# Patient Record
Sex: FEMALE | Race: WHITE | NOT HISPANIC OR LATINO | Employment: UNEMPLOYED | URBAN - METROPOLITAN AREA
[De-identification: names, ages, dates, MRNs, and addresses within clinical notes are randomized per-mention and may not be internally consistent; named-entity substitution may affect disease eponyms.]

---

## 2023-12-14 ENCOUNTER — TELEPHONE (OUTPATIENT)
Dept: PRIMARY CARE | Facility: CLINIC | Age: 38
End: 2023-12-14
Payer: MEDICAID

## 2023-12-14 PROBLEM — R00.2 PALPITATIONS WITH REGULAR CARDIAC RHYTHM: Status: ACTIVE | Noted: 2023-12-14

## 2023-12-14 PROBLEM — K21.9 GERD (GASTROESOPHAGEAL REFLUX DISEASE): Status: ACTIVE | Noted: 2023-12-14

## 2023-12-14 PROBLEM — F41.9 ANXIETY: Status: ACTIVE | Noted: 2023-12-14

## 2023-12-14 PROBLEM — E55.9 VITAMIN D DEFICIENCY: Status: ACTIVE | Noted: 2023-12-14

## 2023-12-14 PROBLEM — E66.9 OBESITY (BMI 30-39.9): Status: ACTIVE | Noted: 2023-12-14

## 2023-12-14 PROBLEM — H61.23 HEARING LOSS OF BOTH EARS DUE TO CERUMEN IMPACTION: Status: ACTIVE | Noted: 2023-12-14

## 2023-12-14 PROBLEM — R23.4 PARAKERATOSIS: Status: ACTIVE | Noted: 2023-12-14

## 2023-12-14 PROBLEM — B36.0 TINEA VERSICOLOR: Status: ACTIVE | Noted: 2023-12-14

## 2023-12-14 PROBLEM — R53.83 FATIGUE: Status: ACTIVE | Noted: 2023-12-14

## 2023-12-14 NOTE — TELEPHONE ENCOUNTER
Pt is calling asking if you can put in her lab orders for her CPE on 1-18-24? Pt would like to get this done before her appt to have the results to discuss at her appointment. Pt is asking if someone can give her a call to let her know when the orders are in?

## 2024-01-02 RX ORDER — KETOCONAZOLE 20 MG/ML
SHAMPOO, SUSPENSION TOPICAL
COMMUNITY
End: 2024-01-18 | Stop reason: SDUPTHER

## 2024-01-02 RX ORDER — METOPROLOL SUCCINATE 25 MG/1
TABLET, EXTENDED RELEASE ORAL
COMMUNITY
End: 2024-01-18 | Stop reason: SDUPTHER

## 2024-01-02 RX ORDER — PANTOPRAZOLE SODIUM 40 MG/1
1 TABLET, DELAYED RELEASE ORAL DAILY
COMMUNITY
Start: 2021-07-20 | End: 2024-01-18

## 2024-01-02 RX ORDER — ERGOCALCIFEROL 1.25 MG/1
1 CAPSULE ORAL
COMMUNITY
Start: 2023-09-30 | End: 2024-01-18 | Stop reason: SDUPTHER

## 2024-01-02 RX ORDER — OMEPRAZOLE 40 MG/1
40 CAPSULE, DELAYED RELEASE ORAL DAILY
COMMUNITY
Start: 2023-09-13 | End: 2024-01-18 | Stop reason: SDUPTHER

## 2024-01-18 ENCOUNTER — APPOINTMENT (OUTPATIENT)
Dept: PRIMARY CARE | Facility: CLINIC | Age: 39
End: 2024-01-18
Payer: MEDICAID

## 2024-01-18 ENCOUNTER — OFFICE VISIT (OUTPATIENT)
Dept: PRIMARY CARE | Facility: CLINIC | Age: 39
End: 2024-01-18
Payer: MEDICAID

## 2024-01-18 VITALS
HEART RATE: 81 BPM | OXYGEN SATURATION: 97 % | HEIGHT: 63 IN | DIASTOLIC BLOOD PRESSURE: 82 MMHG | WEIGHT: 220 LBS | SYSTOLIC BLOOD PRESSURE: 128 MMHG | BODY MASS INDEX: 38.98 KG/M2

## 2024-01-18 DIAGNOSIS — Z13.31 NEGATIVE DEPRESSION SCREENING: ICD-10-CM

## 2024-01-18 DIAGNOSIS — B36.0 TINEA VERSICOLOR: ICD-10-CM

## 2024-01-18 DIAGNOSIS — K21.9 GASTROESOPHAGEAL REFLUX DISEASE WITHOUT ESOPHAGITIS: Primary | ICD-10-CM

## 2024-01-18 DIAGNOSIS — Z00.00 HEALTHCARE MAINTENANCE: ICD-10-CM

## 2024-01-18 DIAGNOSIS — E55.9 VITAMIN D DEFICIENCY: ICD-10-CM

## 2024-01-18 DIAGNOSIS — Z12.31 VISIT FOR SCREENING MAMMOGRAM: ICD-10-CM

## 2024-01-18 DIAGNOSIS — K86.0 ALCOHOL-INDUCED CHRONIC PANCREATITIS (MULTI): ICD-10-CM

## 2024-01-18 DIAGNOSIS — R00.2 PALPITATIONS WITH REGULAR CARDIAC RHYTHM: ICD-10-CM

## 2024-01-18 DIAGNOSIS — R53.83 OTHER FATIGUE: ICD-10-CM

## 2024-01-18 DIAGNOSIS — Z12.31 ENCOUNTER FOR SCREENING MAMMOGRAM FOR MALIGNANT NEOPLASM OF BREAST: ICD-10-CM

## 2024-01-18 DIAGNOSIS — F41.9 ANXIETY: ICD-10-CM

## 2024-01-18 DIAGNOSIS — E66.09 CLASS 2 OBESITY DUE TO EXCESS CALORIES WITHOUT SERIOUS COMORBIDITY WITH BODY MASS INDEX (BMI) OF 38.0 TO 38.9 IN ADULT: ICD-10-CM

## 2024-01-18 PROBLEM — E66.812 CLASS 2 OBESITY DUE TO EXCESS CALORIES WITHOUT SERIOUS COMORBIDITY WITH BODY MASS INDEX (BMI) OF 38.0 TO 38.9 IN ADULT: Status: ACTIVE | Noted: 2024-01-18

## 2024-01-18 PROCEDURE — 99214 OFFICE O/P EST MOD 30 MIN: CPT | Performed by: NURSE PRACTITIONER

## 2024-01-18 PROCEDURE — 3008F BODY MASS INDEX DOCD: CPT | Performed by: NURSE PRACTITIONER

## 2024-01-18 PROCEDURE — 1036F TOBACCO NON-USER: CPT | Performed by: NURSE PRACTITIONER

## 2024-01-18 PROCEDURE — 99396 PREV VISIT EST AGE 40-64: CPT | Performed by: NURSE PRACTITIONER

## 2024-01-18 PROCEDURE — 96127 BRIEF EMOTIONAL/BEHAV ASSMT: CPT | Performed by: NURSE PRACTITIONER

## 2024-01-18 RX ORDER — METOPROLOL SUCCINATE 25 MG/1
TABLET, EXTENDED RELEASE ORAL
Qty: 90 TABLET | Refills: 3 | Status: SHIPPED | OUTPATIENT
Start: 2024-01-18

## 2024-01-18 RX ORDER — OMEPRAZOLE 40 MG/1
40 CAPSULE, DELAYED RELEASE ORAL DAILY
Qty: 90 CAPSULE | Refills: 3 | Status: SHIPPED | OUTPATIENT
Start: 2024-01-18

## 2024-01-18 RX ORDER — ERGOCALCIFEROL 1.25 MG/1
1 CAPSULE ORAL
Qty: 13 CAPSULE | Refills: 3 | Status: SHIPPED | OUTPATIENT
Start: 2024-01-18

## 2024-01-18 RX ORDER — FAMOTIDINE 20 MG/1
20 TABLET, FILM COATED ORAL DAILY
COMMUNITY

## 2024-01-18 RX ORDER — KETOCONAZOLE 20 MG/ML
SHAMPOO, SUSPENSION TOPICAL
Qty: 120 ML | Refills: 5 | Status: SHIPPED | OUTPATIENT
Start: 2024-01-18

## 2024-01-18 ASSESSMENT — PATIENT HEALTH QUESTIONNAIRE - PHQ9
2. FEELING DOWN, DEPRESSED OR HOPELESS: NOT AT ALL
SUM OF ALL RESPONSES TO PHQ9 QUESTIONS 1 & 2: 0
1. LITTLE INTEREST OR PLEASURE IN DOING THINGS: NOT AT ALL

## 2024-01-18 NOTE — PROGRESS NOTES
Annual Comprehensive Medical Exam:    38 y.o. female presents for annual comprehensive medical evaluation and preventive services screening.      Recent hospitalizations, surgeries or ER visits: denies     Allowed to report any questions or concerns.    Diet: Healthy   Caffeine per day: 1 not every day  Water per day: 3 bottles   Exercise: not regimented, active at work  Alcohol: Quit alcohol   Tobacco:  Quit smoking   Dentist: every 6 months   Mammogram: 23  Pelvic and Pap: 2020, needs referral   CT Cardiac Scorin/20 Result 0. Was enrolled in study    Family history and social history reviewed.      Anxiety and palpations:   Med: Toprol XL  Denies suicide or homicide thoughts      GERD:  Med: Omeprazole and Pepcid  GI appt was cancelled  Never had an EGD     Tinea Versicolor: Brother has same condition. Has used OTC shampoo. Worse in the summer.      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   Social History     Socioeconomic History    Marital status:      Spouse name: Rony    Number of children: Not on file    Years of education: Not on file    Highest education level: Not on file   Occupational History    Occupation: [employer]   Tobacco Use    Smoking status: Former     Types: Cigarettes     Quit date:      Years since quitting: 10.0    Smokeless tobacco: Never   Substance and Sexual Activity    Alcohol use: Not  "Currently     Comment: Quit 2014    Drug use: Never    Sexual activity: Not on file   Other Topics Concern    Not on file   Social History Narrative    Not on file     Social Determinants of Health     Financial Resource Strain: Not on file   Food Insecurity: Not on file   Transportation Needs: Not on file   Physical Activity: Not on file   Stress: Not on file   Social Connections: Not on file   Intimate Partner Violence: Not on file   Housing Stability: Not on file           Current Outpatient Medications on File Prior to Visit   Medication Sig Dispense Refill    ergocalciferol (Vitamin D-2) 1.25 MG (73346 UT) capsule Take 1 capsule (1,250 mcg) by mouth 1 (one) time per week.      ketoconazole (NIZOral) 2 % shampoo APPLIED ON DAMPENED SKIN ONCE DAILY  LATHERED FOR 5 MINUTES BEFORE RINSING FOR 14 DAYS      metoprolol succinate XL (Toprol-XL) 25 mg 24 hr tablet TAKE ONE TABLET BY MOUTH DAILY for anxiety/palpitations      omeprazole (PriLOSEC) 40 mg DR capsule Take 1 capsule (40 mg) by mouth once daily.      [DISCONTINUED] pantoprazole (ProtoNix) 40 mg EC tablet Take 1 tablet (40 mg) by mouth once daily.       No current facility-administered medications on file prior to visit.       No Known Allergies    Visit Vitals  /82   Pulse 81   Ht 1.6 m (5' 3\")   Wt 99.8 kg (220 lb)   SpO2 97%   BMI 38.97 kg/m²   Smoking Status Former   BSA 2.11 m²         Physical Exam  Gen: Alert and oriented x3 female in no acute distress.  HEENT: Head is normocephalic.  Pupils equal and reactive to light.   Neck is supple without adenopathy or carotid bruits.  Heart: Regular rate and rhythm without murmurs.  Lungs: Clear to auscultation bilaterally.  Breast:            Deferred to Gyn  Pelvic:            Deferred to Gyn   Abdomen: Soft with normal bowel sounds.  No masses or pain to palpation.  No bruits auscultated.  Extremities: Good range of motion of all joints.  No significant edema. Pedal pulses +1-2/4  Neuro: No signs of focal " neurologic deficit.  No tremor.  Speech and hearing are normal.  DTRs +3/4;  Muscle Strength +5/5.  Musculoskeletal: Spine with good ROM.  Leg lengths are equal.  Skin: No significant or irregular nevi visualized.  Psych: normal affect.  No suicidal ideation.  Good judgment and insight.    Diagnosis/Plan:     1. Healthcare maintenance    - Comprehensive metabolic panel; Future  - CBC; Future  - Lipid panel; Future  - TSH with reflex to Free T4 if abnormal; Future  - Urinalysis with Reflex Microscopic; Future  - Referral to Obstetrics / Gynecology; Future    2. Gastroesophageal reflux disease without esophagitis    - Referral to Gastroenterology; Future  - omeprazole (PriLOSEC) 40 mg DR capsule; Take 1 capsule (40 mg) by mouth once daily.  Dispense: 90 capsule; Refill: 3    3. Alcohol-induced chronic pancreatitis (CMS/HCC)  No recent events. Quit alcohol 2014    4. Tinea versicolor    - ketoconazole (NIZOral) 2 % shampoo; APPLIED ON DAMPENED SKIN ONCE DAILY  LATHERED FOR 5 MINUTES BEFORE RINSING FOR 14 DAYS  Dispense: 120 mL; Refill: 5    5. Anxiety  Stable.   Continue current medication/treatment plan.     Aware at any time if thoughts of suicide or homicide are present contact medical services immediately.    - metoprolol succinate XL (Toprol-XL) 25 mg 24 hr tablet; TAKE ONE TABLET BY MOUTH DAILY for anxiety/palpitations  Dispense: 90 tablet; Refill: 3    6. Palpitations with regular cardiac rhythm  Stable  - metoprolol succinate XL (Toprol-XL) 25 mg 24 hr tablet; TAKE ONE TABLET BY MOUTH DAILY for anxiety/palpitations  Dispense: 90 tablet; Refill: 3      7. Vitamin D deficiency    - Vitamin D 25-Hydroxy,Total (for eval of Vitamin D levels); Future  - ergocalciferol (Vitamin D-2) 1.25 MG (39261 UT) capsule; Take 1 capsule (1,250 mcg) by mouth 1 (one) time per week.  Dispense: 13 capsule; Refill: 3    8. Encounter for screening mammogram for malignant neoplasm of breast    - BI mammo bilateral screening  tomosynthesis; Future    9. Class 2 obesity due to excess calories without serious comorbidity with body mass index (BMI) of 38.0 to 38.9 in adult  Patient encouraged to follow diet of lower carbohydrates and increase vegetable and fruit intake.   Patient also encouraged to increase water intake to 80 ounces/day.   Start or continue exercise for at least 30 minutes a day on most days of the week.     offers various ways to learn about healthy eating and healthy weight loss.     10. Negative depression screening:   Depression screening completed.         Medications refills will be completed as discussed.     Any labs or testing that is ordered will be reviewed and the results will be in your chart .   You can review these via  [employer].     Follow up 1 year for CPE  If she does not follow up with GI, she will need to be seen in 6 months    Prescriptions will not be filled unless you are compliant with your follow-up appointments or have a follow-up appointment scheduled as per the instruction of your provider. Refills for medications should be requested at the time of your office visit.     Please allow one week for refill requests to be completed.     Contact office with any questions or concerns.   Preferred communication is via  [employer]  Please contact [email or web address] if having issues with  [employer]    Larissa Gooden [facility]  [address], [address]  [place] [address]  Phone: [phone]    **Charting was completed using voice recognition technology and may include unintended errors**

## 2024-02-12 ENCOUNTER — APPOINTMENT (OUTPATIENT)
Dept: GASTROENTEROLOGY | Facility: CLINIC | Age: 39
End: 2024-02-12
Payer: MEDICAID

## 2024-02-21 ENCOUNTER — TELEPHONE (OUTPATIENT)
Dept: GASTROENTEROLOGY | Facility: CLINIC | Age: 39
End: 2024-02-21
Payer: MEDICAID

## 2024-03-25 ENCOUNTER — APPOINTMENT (OUTPATIENT)
Dept: GASTROENTEROLOGY | Facility: CLINIC | Age: 39
End: 2024-03-25
Payer: MEDICAID

## 2024-09-30 ENCOUNTER — HOSPITAL ENCOUNTER (OUTPATIENT)
Dept: RADIOLOGY | Facility: CLINIC | Age: 39
Discharge: HOME | End: 2024-09-30
Payer: MEDICAID

## 2024-09-30 VITALS — WEIGHT: 220.02 LBS | HEIGHT: 63 IN | BODY MASS INDEX: 38.98 KG/M2

## 2024-09-30 DIAGNOSIS — Z12.31 ENCOUNTER FOR SCREENING MAMMOGRAM FOR MALIGNANT NEOPLASM OF BREAST: ICD-10-CM

## 2024-09-30 DIAGNOSIS — Z12.31 VISIT FOR SCREENING MAMMOGRAM: ICD-10-CM

## 2024-09-30 PROCEDURE — 77067 SCR MAMMO BI INCL CAD: CPT | Performed by: RADIOLOGY

## 2024-09-30 PROCEDURE — 77063 BREAST TOMOSYNTHESIS BI: CPT | Performed by: RADIOLOGY

## 2024-09-30 PROCEDURE — 77067 SCR MAMMO BI INCL CAD: CPT

## 2024-10-14 ENCOUNTER — PATIENT MESSAGE (OUTPATIENT)
Dept: PRIMARY CARE | Facility: CLINIC | Age: 39
End: 2024-10-14
Payer: MEDICAID

## 2024-10-14 DIAGNOSIS — R00.2 PALPITATIONS WITH REGULAR CARDIAC RHYTHM: ICD-10-CM

## 2024-10-14 DIAGNOSIS — F41.9 ANXIETY: ICD-10-CM

## 2024-10-14 RX ORDER — METOPROLOL SUCCINATE 25 MG/1
TABLET, EXTENDED RELEASE ORAL
Qty: 90 TABLET | Refills: 0 | Status: SHIPPED | OUTPATIENT
Start: 2024-10-14

## 2025-01-20 ENCOUNTER — APPOINTMENT (OUTPATIENT)
Dept: PRIMARY CARE | Facility: CLINIC | Age: 40
End: 2025-01-20
Payer: MEDICAID

## 2025-01-22 ENCOUNTER — TELEPHONE (OUTPATIENT)
Dept: PRIMARY CARE | Facility: CLINIC | Age: 40
End: 2025-01-22
Payer: MEDICAID

## 2025-01-22 DIAGNOSIS — R00.2 PALPITATIONS WITH REGULAR CARDIAC RHYTHM: ICD-10-CM

## 2025-01-22 DIAGNOSIS — F41.9 ANXIETY: ICD-10-CM

## 2025-01-22 RX ORDER — METOPROLOL SUCCINATE 25 MG/1
TABLET, EXTENDED RELEASE ORAL
Qty: 90 TABLET | Refills: 1 | Status: SHIPPED | OUTPATIENT
Start: 2025-01-22

## 2025-01-22 NOTE — TELEPHONE ENCOUNTER
Pt was scheduled for Monday 1/20/25 but was rescheduled to 1/27/25 due to you being out of the office. Pt only has a couple days left of her Metoprolol she wanted to know if you can do a 30 day supply until she can see you on 1/27/25?     Medication:     Metoprolol Succinate XL 25 MG; Take 1 tablet daily for anxiety/palpitations.    Pharmacy: [facility]   Phone Number: [phone]

## 2025-01-27 ENCOUNTER — APPOINTMENT (OUTPATIENT)
Dept: PRIMARY CARE | Facility: CLINIC | Age: 40
End: 2025-01-27
Payer: MEDICAID

## 2025-01-27 VITALS
OXYGEN SATURATION: 100 % | SYSTOLIC BLOOD PRESSURE: 120 MMHG | HEIGHT: 63 IN | HEART RATE: 58 BPM | BODY MASS INDEX: 37.39 KG/M2 | WEIGHT: 211 LBS | DIASTOLIC BLOOD PRESSURE: 80 MMHG

## 2025-01-27 DIAGNOSIS — R00.2 PALPITATIONS WITH REGULAR CARDIAC RHYTHM: ICD-10-CM

## 2025-01-27 DIAGNOSIS — Z12.31 VISIT FOR SCREENING MAMMOGRAM: ICD-10-CM

## 2025-01-27 DIAGNOSIS — B36.0 TINEA VERSICOLOR: ICD-10-CM

## 2025-01-27 DIAGNOSIS — K21.9 GASTROESOPHAGEAL REFLUX DISEASE WITHOUT ESOPHAGITIS: ICD-10-CM

## 2025-01-27 DIAGNOSIS — Z01.419 WELL WOMAN EXAM WITH ROUTINE GYNECOLOGICAL EXAM: Primary | ICD-10-CM

## 2025-01-27 DIAGNOSIS — E66.812 CLASS 2 OBESITY DUE TO EXCESS CALORIES WITHOUT SERIOUS COMORBIDITY WITH BODY MASS INDEX (BMI) OF 37.0 TO 37.9 IN ADULT: ICD-10-CM

## 2025-01-27 DIAGNOSIS — Z00.00 HEALTH CARE MAINTENANCE: ICD-10-CM

## 2025-01-27 DIAGNOSIS — F41.9 ANXIETY: ICD-10-CM

## 2025-01-27 DIAGNOSIS — E55.9 VITAMIN D DEFICIENCY: ICD-10-CM

## 2025-01-27 DIAGNOSIS — E66.09 CLASS 2 OBESITY DUE TO EXCESS CALORIES WITHOUT SERIOUS COMORBIDITY WITH BODY MASS INDEX (BMI) OF 37.0 TO 37.9 IN ADULT: ICD-10-CM

## 2025-01-27 PROBLEM — K86.0 ALCOHOL-INDUCED CHRONIC PANCREATITIS (MULTI): Status: RESOLVED | Noted: 2024-01-18 | Resolved: 2025-01-27

## 2025-01-27 PROBLEM — E66.9 OBESITY (BMI 30-39.9): Status: RESOLVED | Noted: 2023-12-14 | Resolved: 2025-01-27

## 2025-01-27 PROCEDURE — 99214 OFFICE O/P EST MOD 30 MIN: CPT | Performed by: NURSE PRACTITIONER

## 2025-01-27 PROCEDURE — 3008F BODY MASS INDEX DOCD: CPT | Performed by: NURSE PRACTITIONER

## 2025-01-27 PROCEDURE — 99396 PREV VISIT EST AGE 40-64: CPT | Performed by: NURSE PRACTITIONER

## 2025-01-27 PROCEDURE — 1036F TOBACCO NON-USER: CPT | Performed by: NURSE PRACTITIONER

## 2025-01-27 RX ORDER — METOPROLOL SUCCINATE 25 MG/1
TABLET, EXTENDED RELEASE ORAL
Qty: 90 TABLET | Refills: 3 | Status: SHIPPED | OUTPATIENT
Start: 2025-01-27

## 2025-01-27 RX ORDER — LANSOPRAZOLE 30 MG/1
30 CAPSULE, DELAYED RELEASE ORAL
COMMUNITY

## 2025-01-27 RX ORDER — ERGOCALCIFEROL 1.25 MG/1
1 CAPSULE ORAL
Qty: 13 CAPSULE | Refills: 3 | Status: SHIPPED | OUTPATIENT
Start: 2025-01-27

## 2025-01-27 ASSESSMENT — PATIENT HEALTH QUESTIONNAIRE - PHQ9
SUM OF ALL RESPONSES TO PHQ9 QUESTIONS 1 & 2: 0
2. FEELING DOWN, DEPRESSED OR HOPELESS: NOT AT ALL
1. LITTLE INTEREST OR PLEASURE IN DOING THINGS: NOT AT ALL

## 2025-01-27 NOTE — PROGRESS NOTES
Annual Comprehensive Medical Exam:    39 y.o. female presents for annual comprehensive medical evaluation and preventive services screening.      Recent hospitalizations, surgeries or ER visits: denies     Allowed to report any questions or concerns.    Diet: Healthy   Caffeine per day: 1   Water per day: 4-5 bottles   Exercise: not regimented, active at work  Alcohol: Quit alcohol   Tobacco:  Quit smoking   Dentist: every 6 months   Mammogram: 24  Pelvic and Pap: , referral given   CT Cardiac Scorin/20 Result 0. Was enrolled in study    Family history and social history reviewed.      Anxiety and palpations:   Med: Toprol XL nightly   Working well   Denies suicide or homicide thoughts      GERD:  Med: Omeprazole not working so changed to Lansoprazole 30 mg bid OTC   Pepcid as needed  No GI visit yet     Tinea Versicolor: Brother has same condition. Has used OTC shampoo. Worse in the summer.      Born with 3 kidneys.   3rd kidney developed atrophy     Vitamin D deficiency:   Taking prescribed Vit D  Taking MVI    H/o alcohol induced pancreatitis: stopped ETOH     Past Medical History:   Diagnosis Date    Atrophy of kidney (terminal) 2020    Kidney atrophy    Morbid (severe) obesity due to excess calories (Multi) 2019    Obesity, Class III, BMI 40-49.9 (morbid obesity)    Other specified postprocedural states     History of Papanicolaou smear      Past Surgical History:   Procedure Laterality Date    OTHER SURGICAL HISTORY  10/21/2018    Nephrectomy     Family History   Problem Relation Name Age of Onset    Hypertension Mother      Coronary artery disease Father      Hyperlipidemia Father      Other (cabg) Father      Alcohol abuse Father      Ankylosing spondylitis Sister        Social History     Socioeconomic History    Marital status:      Spouse name: Rony    Number of children: Not on file    Years of education: Not on file    Highest education level: Not on file  "  Occupational History    Occupation: [employer]   Tobacco Use    Smoking status: Former     Current packs/day: 0.00     Types: Cigarettes     Quit date:      Years since quittin.0    Smokeless tobacco: Never   Substance and Sexual Activity    Alcohol use: Not Currently     Comment: Quit     Drug use: Never    Sexual activity: Not on file   Other Topics Concern    Not on file   Social History Narrative    Not on file     Social Drivers of Health     Financial Resource Strain: Not on file   Food Insecurity: Not on file   Transportation Needs: Not on file   Physical Activity: Not on file   Stress: Not on file   Social Connections: Not on file   Intimate Partner Violence: Not on file   Housing Stability: Not on file           Current Outpatient Medications on File Prior to Visit   Medication Sig Dispense Refill    ergocalciferol (Vitamin D-2) 1.25 MG (32315 UT) capsule Take 1 capsule (1,250 mcg) by mouth 1 (one) time per week. 13 capsule 3    famotidine (Pepcid) 20 mg tablet Take 1 tablet (20 mg) by mouth once daily.      ketoconazole (NIZOral) 2 % shampoo APPLIED ON DAMPENED SKIN ONCE DAILY  LATHERED FOR 5 MINUTES BEFORE RINSING FOR 14 DAYS 120 mL 5    metoprolol succinate XL (Toprol-XL) 25 mg 24 hr tablet TAKE ONE TABLET BY MOUTH DAILY for anxiety/palpitations 90 tablet 1    omeprazole (PriLOSEC) 40 mg DR capsule Take 1 capsule (40 mg) by mouth once daily. 90 capsule 3    [DISCONTINUED] metoprolol succinate XL (Toprol-XL) 25 mg 24 hr tablet TAKE ONE TABLET BY MOUTH DAILY for anxiety/palpitations 90 tablet 0     No current facility-administered medications on file prior to visit.       No Known Allergies    Visit Vitals  /80   Pulse 58   Ht 1.6 m (5' 3\")   Wt 95.7 kg (211 lb)   SpO2 100%   BMI 37.38 kg/m²   OB Status Having periods   Smoking Status Former   BSA 2.06 m²         Physical Exam    Gen: Alert and oriented x3 female in no acute distress.  HEENT: Head is normocephalic.  Neck is supple without " carotid bruits  Heart: Regular rate and rhythm without murmurs.  Lungs: Clear to auscultation bilaterally.  Breast:         Deferred to Gyn  Pelvic:           Deferred to Gyn   Abdomen: Soft with normal bowel sounds.  No masses or pain to palpation.    Extremities: Good range of motion of all joints.  No significant edema. Pedal pulses +1-2/4  Neuro: No signs of focal neurologic deficit.  No tremor.  Speech and hearing are normal.  DTRs +3/4;  Muscle Strength +5/5.  Musculoskeletal: Spine with good ROM.  Leg lengths are equal.  Skin: No significant or irregular nevi visualized.  Psych: normal affect.  No suicidal ideation.  Good judgment and insight.      Diagnosis/Plan:     1. Health care maintenance      2. Gastroesophageal reflux disease without esophagitis  Continue OTC   - Referral to Gastroenterology; Future    3. Anxiety  Stable.   Continue current medication/treatment plan.     Aware at any time if thoughts of suicide or homicide are present contact medical services immediately.    - metoprolol succinate XL (Toprol-XL) 25 mg 24 hr tablet; TAKE ONE TABLET BY MOUTH DAILY for anxiety/palpitations  Dispense: 90 tablet; Refill: 3    4. Palpitations with regular cardiac rhythm  Stable  - metoprolol succinate XL (Toprol-XL) 25 mg 24 hr tablet; TAKE ONE TABLET BY MOUTH DAILY for anxiety/palpitations  Dispense: 90 tablet; Refill: 3    5. Visit for screening mammogram    - BI mammo bilateral screening tomosynthesis; Future    6. Well woman exam with routine gynecological exam (Primary)    - Referral to Obstetrics / Gynecology; Future    7. Vitamin D deficiency  Vitamin D lab ordered. If your level is low,  a script for a weekly dose of Vitamin D will be sent to pharmacy. You should start or continue a Multivitamin.    - ergocalciferol (Vitamin D-2) 1.25 MG (68530 UT) capsule; Take 1 capsule (1,250 mcg) by mouth 1 (one) time per week.  Dispense: 13 capsule; Refill: 3    8. Tinea versicolor  She will contact us when  refill needed    9. Class 2 obesity due to excess calories without serious comorbidity with body mass index (BMI) of 37.0 to 37.9 in adult  Patient encouraged to follow diet of lower carbohydrates and increase vegetable and fruit intake.   Patient also encouraged to increase water intake to 80 ounces/day.   Start or continue exercise for at least 30 minutes a day on most days of the week.     offers various ways to learn about healthy eating and healthy weight loss.         Medications refills will be completed as discussed.     Any labs or testing that is ordered will be reviewed and the results will be in your chart .   You can review these via  [employer].     Follow up 1 year for CPE    Prescriptions will not be filled unless you are compliant with your follow-up appointments or have a follow-up appointment scheduled as per the instruction of your provider. Refills for medications should be requested at the time of your office visit.     Please allow one week for refill requests to be completed.     Contact office with any questions or concerns.   Preferred communication is via  [employer]  Please contact [email or web address] if having issues with  [employer]    Larissa STONE[facility]  [address], [address]  [place] [address]  Phone: [phone]    **Charting was completed using voice recognition technology and may include unintended errors**

## 2025-02-25 ENCOUNTER — HOSPITAL ENCOUNTER (EMERGENCY)
Facility: HOSPITAL | Age: 40
Discharge: HOME | End: 2025-02-25
Payer: MEDICAID

## 2025-02-25 VITALS
SYSTOLIC BLOOD PRESSURE: 180 MMHG | HEIGHT: 64 IN | TEMPERATURE: 97.9 F | RESPIRATION RATE: 18 BRPM | DIASTOLIC BLOOD PRESSURE: 99 MMHG | BODY MASS INDEX: 35.51 KG/M2 | WEIGHT: 208 LBS | HEART RATE: 79 BPM | OXYGEN SATURATION: 99 %

## 2025-02-25 DIAGNOSIS — N30.90 CYSTITIS: Primary | ICD-10-CM

## 2025-02-25 LAB
APPEARANCE UR: ABNORMAL
BILIRUB UR STRIP.AUTO-MCNC: NEGATIVE MG/DL
CLUE CELLS SPEC QL WET PREP: NORMAL
COLOR UR: ABNORMAL
GLUCOSE UR STRIP.AUTO-MCNC: NORMAL MG/DL
HCG UR QL IA.RAPID: NEGATIVE
KETONES UR STRIP.AUTO-MCNC: NEGATIVE MG/DL
LEUKOCYTE ESTERASE UR QL STRIP.AUTO: ABNORMAL
NITRITE UR QL STRIP.AUTO: NEGATIVE
PH UR STRIP.AUTO: 7 [PH]
PROT UR STRIP.AUTO-MCNC: NEGATIVE MG/DL
RBC # UR STRIP.AUTO: ABNORMAL MG/DL
RBC #/AREA URNS AUTO: ABNORMAL /HPF
SP GR UR STRIP.AUTO: 1.01
SQUAMOUS #/AREA URNS AUTO: ABNORMAL /HPF
T VAGINALIS SPEC QL WET PREP: NORMAL
TRICHOMONAS REFLEX COMMENT: NORMAL
UROBILINOGEN UR STRIP.AUTO-MCNC: NORMAL MG/DL
WBC #/AREA URNS AUTO: >50 /HPF
WBC VAG QL WET PREP: NORMAL
YEAST VAG QL WET PREP: NORMAL

## 2025-02-25 PROCEDURE — 81025 URINE PREGNANCY TEST: CPT

## 2025-02-25 PROCEDURE — 99284 EMERGENCY DEPT VISIT MOD MDM: CPT

## 2025-02-25 PROCEDURE — 87086 URINE CULTURE/COLONY COUNT: CPT | Mod: STJLAB | Performed by: EMERGENCY MEDICINE

## 2025-02-25 PROCEDURE — 99283 EMERGENCY DEPT VISIT LOW MDM: CPT

## 2025-02-25 PROCEDURE — 2500000001 HC RX 250 WO HCPCS SELF ADMINISTERED DRUGS (ALT 637 FOR MEDICARE OP)

## 2025-02-25 PROCEDURE — 81001 URINALYSIS AUTO W/SCOPE: CPT | Performed by: EMERGENCY MEDICINE

## 2025-02-25 PROCEDURE — 87210 SMEAR WET MOUNT SALINE/INK: CPT

## 2025-02-25 RX ORDER — CEPHALEXIN 500 MG/1
500 CAPSULE ORAL 2 TIMES DAILY
Qty: 13 CAPSULE | Refills: 0 | Status: SHIPPED | OUTPATIENT
Start: 2025-02-25 | End: 2025-03-04

## 2025-02-25 RX ORDER — CEPHALEXIN 500 MG/1
500 CAPSULE ORAL ONCE
Status: COMPLETED | OUTPATIENT
Start: 2025-02-25 | End: 2025-02-25

## 2025-02-25 RX ADMIN — CEPHALEXIN 500 MG: 500 CAPSULE ORAL at 16:01

## 2025-02-25 ASSESSMENT — LIFESTYLE VARIABLES
EVER HAD A DRINK FIRST THING IN THE MORNING TO STEADY YOUR NERVES TO GET RID OF A HANGOVER: NO
EVER FELT BAD OR GUILTY ABOUT YOUR DRINKING: NO
HAVE YOU EVER FELT YOU SHOULD CUT DOWN ON YOUR DRINKING: NO
TOTAL SCORE: 0
HAVE PEOPLE ANNOYED YOU BY CRITICIZING YOUR DRINKING: NO

## 2025-02-25 ASSESSMENT — PAIN - FUNCTIONAL ASSESSMENT: PAIN_FUNCTIONAL_ASSESSMENT: 0-10

## 2025-02-25 ASSESSMENT — COLUMBIA-SUICIDE SEVERITY RATING SCALE - C-SSRS
1. IN THE PAST MONTH, HAVE YOU WISHED YOU WERE DEAD OR WISHED YOU COULD GO TO SLEEP AND NOT WAKE UP?: NO
2. HAVE YOU ACTUALLY HAD ANY THOUGHTS OF KILLING YOURSELF?: NO
6. HAVE YOU EVER DONE ANYTHING, STARTED TO DO ANYTHING, OR PREPARED TO DO ANYTHING TO END YOUR LIFE?: NO

## 2025-02-25 ASSESSMENT — PAIN SCALES - GENERAL: PAINLEVEL_OUTOF10: 0 - NO PAIN

## 2025-02-25 NOTE — Clinical Note
Ruby Choudhury was seen and treated in our emergency department on 2/25/2025.  She may return to work on 02/26/2025.       If you have any questions or concerns, please don't hesitate to call.      Thais Foster PA-C

## 2025-02-25 NOTE — DISCHARGE INSTRUCTIONS
Urine consistent with a UTI. Swabs negative for yeast and bacterial vaginosis. Sending you home with keflex. Take twice a day for 7 days. Sending off urine culture to confirm that keflex will work for the UTI. If you still have symptoms, please follow up with PCP as it is concerning that you took macrobid and bactrim and did not get rid of the UTI. Both of these medications are very common antibiotics for UTIs.     If you develop fevers, back pain, abdominal pain, worsening symptoms of any kind, please come back to the ER>

## 2025-02-25 NOTE — ED PROVIDER NOTES
"HPI   Chief Complaint   Patient presents with    UTI       Patient is a 39-year-old female with PMH of GERD, chronic pancreatitis, atrophy of kidney recurrent UTIs, presenting today for concerns of UTI.  Believes that she had a UTI 1 month ago.  Went online and someone prescribed her Macrobid for the UTI.  States that after she finished a 7-day course of Macrobid, she felt as if she \"still had a UTI\" so she took a 5-day course of Bactrim that was given to her by a friend.  States that she still has symptoms.  Denies abdominal pain, flank pain, dysuria, hematuria, increased urinary frequency.  States that she is currently on her cycle.  Reports that \"something is just off down there and it feels like her urethra hurts\".  No fever, chills, nausea or vomiting.  Denies any vaginal discharge or concerns of UTI.              Patient History   Past Medical History:   Diagnosis Date    Alcohol-induced chronic pancreatitis (Multi) 2024    Atrophy of kidney (terminal) 2020    Kidney atrophy    Morbid (severe) obesity due to excess calories (Multi) 2019    Obesity, Class III, BMI 40-49.9 (morbid obesity)    Other specified postprocedural states     History of Papanicolaou smear     Past Surgical History:   Procedure Laterality Date    OTHER SURGICAL HISTORY  10/21/2018    Nephrectomy     Family History   Problem Relation Name Age of Onset    Hypertension Mother      Coronary artery disease Father      Hyperlipidemia Father      Other (cabg) Father      Alcohol abuse Father      Ankylosing spondylitis Sister       Social History     Tobacco Use    Smoking status: Former     Current packs/day: 0.00     Types: Cigarettes     Quit date:      Years since quittin.1    Smokeless tobacco: Never   Substance Use Topics    Alcohol use: Not Currently     Comment: Quit     Drug use: Never       Physical Exam   ED Triage Vitals [25 1245]   Temperature Heart Rate Respirations BP   36.6 °C (97.9 °F) 79 18 " (!) 180/99      Pulse Ox Temp src Heart Rate Source Patient Position   99 % -- Monitor Lying      BP Location FiO2 (%)     Right arm --       Physical Exam  Vitals and nursing note reviewed.   Constitutional:       General: She is not in acute distress.     Appearance: Normal appearance. She is not ill-appearing.   HENT:      Head: Normocephalic and atraumatic.      Right Ear: External ear normal.      Left Ear: External ear normal.      Nose: Nose normal. No congestion or rhinorrhea.      Mouth/Throat:      Mouth: Mucous membranes are moist.      Pharynx: Oropharynx is clear. No oropharyngeal exudate or posterior oropharyngeal erythema.   Eyes:      Extraocular Movements: Extraocular movements intact.      Conjunctiva/sclera: Conjunctivae normal.      Pupils: Pupils are equal, round, and reactive to light.   Cardiovascular:      Rate and Rhythm: Normal rate and regular rhythm.      Heart sounds: Normal heart sounds.   Pulmonary:      Effort: No accessory muscle usage or respiratory distress.      Breath sounds: Normal breath sounds. No wheezing, rhonchi or rales.   Abdominal:      General: Abdomen is flat. Bowel sounds are normal. There is no distension.      Palpations: Abdomen is soft.      Tenderness: There is no abdominal tenderness. There is no right CVA tenderness or left CVA tenderness.   Musculoskeletal:         General: No swelling or deformity. Normal range of motion.      Cervical back: Normal range of motion and neck supple.      Right lower leg: No edema.      Left lower leg: No edema.   Skin:     General: Skin is warm and dry.      Capillary Refill: Capillary refill takes less than 2 seconds.   Neurological:      General: No focal deficit present.      Mental Status: She is alert and oriented to person, place, and time.      GCS: GCS eye subscore is 4. GCS verbal subscore is 5. GCS motor subscore is 6.      Cranial Nerves: Cranial nerves 2-12 are intact.      Sensory: No sensory deficit.      Motor:  "Motor function is intact. No weakness.   Psychiatric:         Mood and Affect: Mood and affect normal.         Speech: Speech normal.         Behavior: Behavior normal. Behavior is cooperative.           ED Course & MDM   ED Course as of 02/25/25 1557   Tue Feb 25, 2025   1346 Wet Prep with Trichomonas Reflex If Neg  No BV, yeast, clue cells [ML]   1512 HCG, Urine: NEGATIVE [ML]   1547 Leukocyte Esterase, Urine(!): 500 [place]/uL [ML]      ED Course User Index  [ML] Thais Foster PA-C         Diagnoses as of 02/25/25 1557   Cystitis                 No data recorded     Wiley Coma Scale Score: 15 (02/25/25 1246 : Eula Pillai, MAJO)                           Medical Decision Making  39-year-old female presenting today for concerns of UTI.  States that she has \"irritation at her urethra\".  Denies vaginal discharge or concern of STI.  No flank pain noted on my exam, no abdominal pain noted on my exam.  Vitals are stable.  She is afebrile.  Considering she has been through Bactrim and Macrobid at home in the past month, we will also obtain a wet prep to evaluate if this is BV or yeast infection causing the irritation.  Deferred  exam as she has no abdominal pain and I have low suspicion for PID.  UA obtained.  Urine pregnancy test negative.    Wet prep negative for trichomonas, clue cells yeast cells.  UA showing findings of gross leukocyte Estrace and 50+ WBC in urine.  Consistent with UTI.  Will trial Keflex considering in the past months she has done full courses of Bactrim and Macrobid.  Will send urine off for culture to confirm.  Discussed that since she has been through 2 antibiotics now, would recommend following up with primary care for further evaluation if symptoms are not fully relieved after Keflex.  Discussed strict return precautions.        Procedure  Procedures     Thais Foster PA-C  02/25/25 1557    "

## 2025-02-26 LAB
BACTERIA UR CULT: NORMAL
HOLD SPECIMEN: NORMAL

## 2025-03-03 ENCOUNTER — PATIENT MESSAGE (OUTPATIENT)
Dept: PRIMARY CARE | Facility: CLINIC | Age: 40
End: 2025-03-03
Payer: MEDICAID

## 2025-03-03 DIAGNOSIS — N30.00 ACUTE CYSTITIS WITHOUT HEMATURIA: Primary | ICD-10-CM

## 2025-03-05 DIAGNOSIS — N39.0 CHRONIC UTI: Primary | ICD-10-CM

## 2025-03-05 LAB — BACTERIA UR CULT: NORMAL

## 2025-03-16 ENCOUNTER — HOSPITAL ENCOUNTER (EMERGENCY)
Facility: HOSPITAL | Age: 40
Discharge: HOME | End: 2025-03-16
Attending: STUDENT IN AN ORGANIZED HEALTH CARE EDUCATION/TRAINING PROGRAM
Payer: MEDICAID

## 2025-03-16 VITALS
HEART RATE: 74 BPM | SYSTOLIC BLOOD PRESSURE: 173 MMHG | RESPIRATION RATE: 15 BRPM | HEIGHT: 64 IN | OXYGEN SATURATION: 100 % | WEIGHT: 208 LBS | BODY MASS INDEX: 35.51 KG/M2 | TEMPERATURE: 97.3 F | DIASTOLIC BLOOD PRESSURE: 81 MMHG

## 2025-03-16 DIAGNOSIS — R30.0 DYSURIA: Primary | ICD-10-CM

## 2025-03-16 LAB
APPEARANCE UR: ABNORMAL
BACTERIA #/AREA URNS AUTO: ABNORMAL /HPF
BILIRUB UR STRIP.AUTO-MCNC: NEGATIVE MG/DL
COLOR UR: YELLOW
GLUCOSE UR STRIP.AUTO-MCNC: NORMAL MG/DL
KETONES UR STRIP.AUTO-MCNC: ABNORMAL MG/DL
LEUKOCYTE ESTERASE UR QL STRIP.AUTO: ABNORMAL
NITRITE UR QL STRIP.AUTO: NEGATIVE
PH UR STRIP.AUTO: 5.5 [PH]
PROT UR STRIP.AUTO-MCNC: NEGATIVE MG/DL
RBC # UR STRIP.AUTO: ABNORMAL MG/DL
RBC #/AREA URNS AUTO: ABNORMAL /HPF
SP GR UR STRIP.AUTO: 1
UROBILINOGEN UR STRIP.AUTO-MCNC: NORMAL MG/DL
WBC #/AREA URNS AUTO: >50 /HPF
YEAST BUDDING #/AREA UR COMP ASSIST: PRESENT /HPF

## 2025-03-16 PROCEDURE — 81003 URINALYSIS AUTO W/O SCOPE: CPT | Performed by: EMERGENCY MEDICINE

## 2025-03-16 PROCEDURE — 2500000001 HC RX 250 WO HCPCS SELF ADMINISTERED DRUGS (ALT 637 FOR MEDICARE OP)

## 2025-03-16 PROCEDURE — 81001 URINALYSIS AUTO W/SCOPE: CPT | Performed by: STUDENT IN AN ORGANIZED HEALTH CARE EDUCATION/TRAINING PROGRAM

## 2025-03-16 PROCEDURE — 99283 EMERGENCY DEPT VISIT LOW MDM: CPT | Performed by: STUDENT IN AN ORGANIZED HEALTH CARE EDUCATION/TRAINING PROGRAM

## 2025-03-16 PROCEDURE — 2500000004 HC RX 250 GENERAL PHARMACY W/ HCPCS (ALT 636 FOR OP/ED): Performed by: STUDENT IN AN ORGANIZED HEALTH CARE EDUCATION/TRAINING PROGRAM

## 2025-03-16 PROCEDURE — 99284 EMERGENCY DEPT VISIT MOD MDM: CPT | Performed by: STUDENT IN AN ORGANIZED HEALTH CARE EDUCATION/TRAINING PROGRAM

## 2025-03-16 PROCEDURE — 96372 THER/PROPH/DIAG INJ SC/IM: CPT | Performed by: STUDENT IN AN ORGANIZED HEALTH CARE EDUCATION/TRAINING PROGRAM

## 2025-03-16 RX ORDER — PHENAZOPYRIDINE HYDROCHLORIDE 100 MG/1
95 TABLET, FILM COATED ORAL ONCE
Status: COMPLETED | OUTPATIENT
Start: 2025-03-16 | End: 2025-03-16

## 2025-03-16 RX ORDER — PHENAZOPYRIDINE HYDROCHLORIDE 200 MG/1
200 TABLET, FILM COATED ORAL 3 TIMES DAILY
Qty: 6 TABLET | Refills: 0 | Status: SHIPPED | OUTPATIENT
Start: 2025-03-16 | End: 2025-03-18

## 2025-03-16 RX ADMIN — LIDOCAINE HYDROCHLORIDE 1 G: 10 INJECTION, SOLUTION EPIDURAL; INFILTRATION; INTRACAUDAL; PERINEURAL at 13:23

## 2025-03-16 RX ADMIN — PHENAZOPYRIDINE 100 MG: 100 TABLET ORAL at 13:23

## 2025-03-16 ASSESSMENT — COLUMBIA-SUICIDE SEVERITY RATING SCALE - C-SSRS
6. HAVE YOU EVER DONE ANYTHING, STARTED TO DO ANYTHING, OR PREPARED TO DO ANYTHING TO END YOUR LIFE?: NO
2. HAVE YOU ACTUALLY HAD ANY THOUGHTS OF KILLING YOURSELF?: NO
1. IN THE PAST MONTH, HAVE YOU WISHED YOU WERE DEAD OR WISHED YOU COULD GO TO SLEEP AND NOT WAKE UP?: NO

## 2025-03-16 ASSESSMENT — LIFESTYLE VARIABLES
HAVE YOU EVER FELT YOU SHOULD CUT DOWN ON YOUR DRINKING: NO
EVER FELT BAD OR GUILTY ABOUT YOUR DRINKING: NO
TOTAL SCORE: 0
HAVE PEOPLE ANNOYED YOU BY CRITICIZING YOUR DRINKING: NO
EVER HAD A DRINK FIRST THING IN THE MORNING TO STEADY YOUR NERVES TO GET RID OF A HANGOVER: NO

## 2025-03-16 ASSESSMENT — PAIN DESCRIPTION - LOCATION: LOCATION: ABDOMEN

## 2025-03-16 ASSESSMENT — PAIN DESCRIPTION - PAIN TYPE: TYPE: ACUTE PAIN

## 2025-03-16 ASSESSMENT — PAIN DESCRIPTION - ORIENTATION: ORIENTATION: LOWER

## 2025-03-16 ASSESSMENT — PAIN SCALES - GENERAL: PAINLEVEL_OUTOF10: 2

## 2025-03-16 ASSESSMENT — PAIN - FUNCTIONAL ASSESSMENT: PAIN_FUNCTIONAL_ASSESSMENT: 0-10

## 2025-03-16 NOTE — DISCHARGE INSTRUCTIONS
You were evaluated for a UTI.  You were treated with a strong dose of antibiotics as well as a medication to help with the pain associated with the UTI.  A prescription for this medication was sent to your pharmacy.  A urinalysis was collected, and we will contact you with the results of the culture once they have been evaluated.  We would recommend that you continue to take the Macrobid as prescribed.    If you have fevers or chills, or worsening signs of UTI, please return to the emergency room.

## 2025-03-16 NOTE — ED PROVIDER NOTES
History of Present Illness     History provided by: Patient  Limitations to History: None  External Records Reviewed with Brief Summary:  see below    HPI:  Ruby Choudhury is a 39 y.o. female with dysuria.  No significant past medical history.  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  Patient reports that she has had dysuria for the past few weeks.  She did see her PCP, who obtained a UA and prescribed a course of Keflex.  She reports that she took that as directed, but noticed continued symptoms afterwards.  Thus she was given a prescription for Bactrim, and took 2 days of that but symptoms were persistent, and thus was switched to Macrobid.  Per chart review she does have a UA in the chart from 2/25 which was positive for 500 leukocyte esterase but ultimately did not show any clinically significant bacterial growth, in fact contaminant was seen on culture.  Repeat culture was done on 3/3 which was negative for any clinically significant growth at that time.  Patient reports that her initial symptoms were dysuria primarily, no change in urination frequency.  She denied any suprapubic pain.  At this time she denies any suprapubic pain.  No fevers or chills.  Dysuria is improving.  She presents to the emergency room today because she had pain when she removed her period cup.  No other symptoms, no headache, no chest pain, shortness of breath, abdominal pain, nausea, vomiting, change in bowels, lower extremity edema.    Physical Exam   Triage vitals:  T 36.3 °C (97.3 °F)  HR (!) 110  /83  RR 16  O2 100 % None (Room air)    Physical Exam  Constitutional:       General: She is not in acute distress.     Appearance: Normal appearance. She is not ill-appearing.   HENT:      Head: Normocephalic and atraumatic.      Mouth/Throat:      Mouth: Mucous membranes are moist.   Eyes:      Extraocular Movements: Extraocular movements intact.      Pupils: Pupils are  equal, round, and reactive to light.   Cardiovascular:      Rate and Rhythm: Normal rate and regular rhythm.      Pulses: Normal pulses.      Heart sounds: No murmur heard.     No gallop.   Pulmonary:      Effort: Pulmonary effort is normal. No respiratory distress.      Breath sounds: Normal breath sounds. No wheezing, rhonchi or rales.   Abdominal:      General: Abdomen is flat. There is no distension.      Palpations: Abdomen is soft.      Tenderness: There is no abdominal tenderness. There is no right CVA tenderness, left CVA tenderness, guarding or rebound.   Musculoskeletal:         General: Normal range of motion.      Cervical back: Normal range of motion and neck supple.      Right lower leg: No edema.      Left lower leg: No edema.   Skin:     General: Skin is warm and dry.   Neurological:      General: No focal deficit present.      Mental Status: She is alert and oriented to person, place, and time.      Sensory: No sensory deficit.      Motor: No weakness.          Medical Decision Making & ED Course   Medical Decision Makin y.o. female with ongoing symptoms of dysuria.  No significant past medical history.  Did have a nephrectomy about 20 years ago for supernumerary kidney.  Has completed at least 1 course of antibiotics, and is 2 days into an additional course of Macrobid.  Reports that her dysuria is improving, but did have some pain when removing her period cup today.  She denies any suprapubic pain at this time.  No systemic symptoms.  On physical exam, no significant abnormalities.  Heart rate was regular, lungs clear to auscultation, nontender to palpation the abdomen.  No suprapubic tenderness noted.  No lower extremity edema.    Given that her symptoms are improving, anticipate that she does have a UTI that will improve with continuation of her antibiotic treatment course.  We will obtain a UA today.  We did discuss pelvic exam or STD testing, but patient declined.  She reports that she  has not been sexually active within the past month, and has been augments with her .  She does not have any symptoms of an STD per patient report.  UA did show blood, ketones, and 500 leukocyte esterase.  WBCs and bacteria were seen as well, as well as budding yeast.  We will give her a dose of Rocephin in the emergency room, and a dose of pyridium.  She did have some relief after the dose of Pyridium.    Patient was discharged home in stable condition.  She was instructed to finish her course of Macrobid.  We will call with results of the urine culture once they are made available.  She should follow-up with her PCP.  She will also be given a prescription for Pyridium for symptomatic relief.  She was instructed to return if she develops fevers or chills, or if she has urinary retention or significant worsening of her dysuria.  ----  Social Determinants of Health which Significantly Impact Care: None identified     EKG Independent Interpretation: EKG not obtained    Independent Result Review and Interpretation: Relevant laboratory and radiographic results were reviewed and independently interpreted by myself.  As necessary, they are commented on in the ED Course.    Chronic conditions affecting the patient's care: As documented above in MDM    The patient was discussed with the following consultants/services: None    Care Considerations: Discussed STD testing, but patient declined.    ED Course:  ED Course as of 03/16/25 1457   Sun Mar 16, 2025   1346 Did discuss STD testing at this time, but patient declined.  She reports that she has not had any sexual activity in the last month, and is monogamous with her . [CH]   7803 Reviewed chart, UA collected 2/25.  Significant for turbid urine with 2+ blood and 500 leukocyte esterase.  Culture indicated contamination.  Another culture was obtained on 3/3, which did not show any clinically significant growth. [CH]      ED Course User Index  [CH] Gayathri  DO Mikhail         Diagnoses as of 03/16/25 145   Dysuria     Disposition   As a result of the work-up, the patient was discharged home.  she was informed of her diagnosis and instructed to come back with any concerns or worsening of condition.  she and was agreeable to the plan as discussed above.  she was given the opportunity to ask questions.  All of the patient's questions were answered.    Procedures   Procedures    Patient seen and discussed with ED attending physician.    Gayathri Elizondo DO  Internal Medicine PGY-1 Resident  Emergency Medicine       Gayathri Elizondo DO  Resident  03/16/25 7911

## 2025-03-20 ENCOUNTER — APPOINTMENT (OUTPATIENT)
Dept: OBSTETRICS AND GYNECOLOGY | Facility: CLINIC | Age: 40
End: 2025-03-20
Payer: MEDICAID

## 2025-03-20 VITALS
BODY MASS INDEX: 34.83 KG/M2 | SYSTOLIC BLOOD PRESSURE: 146 MMHG | HEIGHT: 64 IN | WEIGHT: 204 LBS | DIASTOLIC BLOOD PRESSURE: 88 MMHG

## 2025-03-20 DIAGNOSIS — Z01.419 WELL WOMAN EXAM: Primary | ICD-10-CM

## 2025-03-20 DIAGNOSIS — Z01.419 WELL WOMAN EXAM WITH ROUTINE GYNECOLOGICAL EXAM: ICD-10-CM

## 2025-03-20 DIAGNOSIS — R39.9 UTI SYMPTOMS: ICD-10-CM

## 2025-03-20 LAB
POC APPEARANCE, URINE: CLEAR
POC BILIRUBIN, URINE: NEGATIVE
POC BLOOD, URINE: ABNORMAL
POC COLOR, URINE: YELLOW
POC GLUCOSE, URINE: NEGATIVE MG/DL
POC KETONES, URINE: NEGATIVE MG/DL
POC LEUKOCYTES, URINE: ABNORMAL
POC NITRITE,URINE: NEGATIVE
POC PH, URINE: 7.5 PH
POC PROTEIN, URINE: NEGATIVE MG/DL
POC SPECIFIC GRAVITY, URINE: 1.02
POC UROBILINOGEN, URINE: 0.2 EU/DL

## 2025-03-20 PROCEDURE — 3008F BODY MASS INDEX DOCD: CPT | Performed by: ADVANCED PRACTICE MIDWIFE

## 2025-03-20 PROCEDURE — 87624 HPV HI-RISK TYP POOLED RSLT: CPT

## 2025-03-20 PROCEDURE — 99386 PREV VISIT NEW AGE 40-64: CPT | Performed by: ADVANCED PRACTICE MIDWIFE

## 2025-03-20 PROCEDURE — 1036F TOBACCO NON-USER: CPT | Performed by: ADVANCED PRACTICE MIDWIFE

## 2025-03-20 PROCEDURE — 81003 URINALYSIS AUTO W/O SCOPE: CPT | Performed by: ADVANCED PRACTICE MIDWIFE

## 2025-03-20 ASSESSMENT — PAIN SCALES - GENERAL: PAINLEVEL_OUTOF10: 0-NO PAIN

## 2025-03-20 NOTE — PROGRESS NOTES
Assessment/Plan   Problem List Items Addressed This Visit             ICD-10-CM       Medium    Well woman exam - Primary Z01.419     Pap w/ cotesting, Declines STI screen  Mammogram due in September  UA with trace blood (on menstrual cycle) and trace leukocytes-culture held due to pending culture, also referred to Uro/Gyn to discuss history and symptoms    Recommended to check BP at home to monitor given elevated BP at office visits           Other Visit Diagnoses         Codes    Well woman exam with routine gynecological exam     Z01.419    Relevant Orders    THINPREP PAP TEST (>30)    UTI symptoms     R39.9    Relevant Orders    POCT UA Automated manually resulted (Completed)    Urine culture            Lin Ferreira, BERTHA-CNM     Subjective   Ruby Choudhury is a 39 y.o. female who is here for a routine exam.     New patient to establish care     +Health anxiety/white coat HTN  Metoprolol daily, Xanax prn     Concerns re: UTI-slightly better but reports still symptomatic with urgency    Symptoms started about a month ago   Has taken multiple antibiotics-Macrobid, Bactrim, Keflex, and Ceftriaxone  ED visits x 2->2/25, 3/16 (culture pending)  Reports nephrectomy   3/3 culture negative    Reports urethra irritation while removing menstrual cup also which was concerning     Lives with:    Current employment: Owner of [employer]   T/E/D: Former tobacco (quit 2014)/recovery ETOH/drug use 2014  Up to date vision/dental: no/yes   PCP: yes   Menstrual history: monthly cycles, x 7 days   Sexual history: monogamous male partner x 11 years  Denies DV   Pregnancy history:  G/P 0  T: P:  EAB:   Ectopic:   SAB:   L:      Diet: trying to eat    Exercise: walking     PMH, PSH, and Family hx reviewed and updated    Current contraception: vasectomy  Refill Y/N:    Last pap: 2020  History of abnormal Pap smear: no  Family history of breast, uterine,  ovarian cancer: no  Regular self breast exam: yes  Last  "mammogram: 09/2024  History of abnormal mammogram: no-dense breasts           Review of Systems    Objective   /88   Ht 1.626 m (5' 4\")   Wt 92.5 kg (204 lb)   LMP 03/14/2025   BMI 35.02 kg/m²     Physical Exam  Constitutional:       Appearance: Normal appearance.   HENT:      Head: Normocephalic.   Neck:      Thyroid: No thyroid mass, thyromegaly or thyroid tenderness.   Cardiovascular:      Rate and Rhythm: Normal rate and regular rhythm.   Pulmonary:      Effort: Pulmonary effort is normal.      Breath sounds: Normal breath sounds.   Chest:   Breasts:     Right: Normal. No mass, nipple discharge or tenderness.      Left: Normal. No mass, nipple discharge or tenderness.   Abdominal:      Palpations: Abdomen is soft.   Genitourinary:     General: Normal vulva.      Vagina: Normal.      Cervix: Normal.   Musculoskeletal:         General: Normal range of motion.   Lymphadenopathy:      Upper Body:      Right upper body: No axillary adenopathy.      Left upper body: No axillary adenopathy.   Skin:     General: Skin is warm and dry.   Neurological:      Mental Status: She is alert and oriented to person, place, and time.   Psychiatric:         Mood and Affect: Mood normal.         "

## 2025-03-21 NOTE — ASSESSMENT & PLAN NOTE
Pap w/ cotesting, Declines STI screen  Mammogram due in September  UA with trace blood (on menstrual cycle) and trace leukocytes-culture held due to pending culture, also referred to Uro/Gyn to discuss history and symptoms    Recommended to check BP at home to monitor given elevated BP at office visits

## 2025-03-28 LAB
CYTOLOGY CMNT CVX/VAG CYTO-IMP: NORMAL
HPV HR 12 DNA GENITAL QL NAA+PROBE: NEGATIVE
HPV HR GENOTYPES PNL CVX NAA+PROBE: NEGATIVE
HPV16 DNA SPEC QL NAA+PROBE: NEGATIVE
HPV18 DNA SPEC QL NAA+PROBE: NEGATIVE
LAB AP HPV GENOTYPE QUESTION: YES
LAB AP HPV HR: NORMAL
LABORATORY COMMENT REPORT: NORMAL
LABORATORY COMMENT REPORT: NORMAL
LMP START DATE: NORMAL
PATH REPORT.TOTAL CANCER: NORMAL

## 2025-03-31 ENCOUNTER — APPOINTMENT (OUTPATIENT)
Dept: OBSTETRICS AND GYNECOLOGY | Facility: CLINIC | Age: 40
End: 2025-03-31
Payer: MEDICAID

## 2025-03-31 VITALS
DIASTOLIC BLOOD PRESSURE: 70 MMHG | BODY MASS INDEX: 34.83 KG/M2 | HEIGHT: 64 IN | SYSTOLIC BLOOD PRESSURE: 110 MMHG | WEIGHT: 204 LBS

## 2025-03-31 DIAGNOSIS — R30.0 DYSURIA: Primary | ICD-10-CM

## 2025-03-31 DIAGNOSIS — R39.9 UTI SYMPTOMS: ICD-10-CM

## 2025-03-31 DIAGNOSIS — N39.46 MIXED STRESS AND URGE URINARY INCONTINENCE: ICD-10-CM

## 2025-03-31 LAB
POC APPEARANCE, URINE: CLEAR
POC BILIRUBIN, URINE: NEGATIVE
POC BLOOD, URINE: ABNORMAL
POC COLOR, URINE: YELLOW
POC GLUCOSE, URINE: NEGATIVE MG/DL
POC KETONES, URINE: NEGATIVE MG/DL
POC LEUKOCYTES, URINE: ABNORMAL
POC NITRITE,URINE: NEGATIVE
POC PH, URINE: 6 PH
POC PROTEIN, URINE: NEGATIVE MG/DL
POC SPECIFIC GRAVITY, URINE: 1.01
POC UROBILINOGEN, URINE: 0.2 EU/DL

## 2025-03-31 ASSESSMENT — PAIN SCALES - GENERAL: PAINLEVEL_OUTOF10: 0-NO PAIN

## 2025-03-31 NOTE — PROGRESS NOTES
Ruby Choudhury is a 39 y.o. referred by Lin STONE CNM for recurrent UTI.     Chief Complaint: Martha     Urine Cx:  - 3/3/25: No growth   - 2/25/25: Growth indicates contamination with periurethral pauly. Repeat culture if clinically indicated.     Microscopic UA 3/16/25: Yeast Present     OB/GYN History:   - G0  - Menses are monthly and regular.   - Not on contraception.   - Pap up to date: Yes - 3/20/25 NILM, HPV negative   - Sexually active:  Yes  Dyspareunia: No    Prolapse symptoms:   - denies sense of incomplete emptying    Urinary symptoms:   - The patient had a third kidney removed at age 17, which previously contributed to frequent UTIs.   - Recent UTI episode began approximately a month ago, with symptoms persisting despite multiple antibiotic treatments. Initial treatment with Cephalexin twice daily was ineffective, followed by Keflex three times daily, which helped but did not resolve the infection. She was then put on Bactrim x2 days, which she states did not work. She was then put on Macrobid. She then went to the ED and received Rocephin. The Rocephin did help.   - She has some sensation of bladder inflammation today and has decided to restart the rest of the Bactrim that she has leftover   - She denies dysuria, but can feel some remaining inflammation that is slowly improving.   - She's been resistant to abx in the past because she was on so many abx as a child for Martha.   - After her 3/3/25 negative urine culture, she went to an urgent care and was told she had a UTI based on a urine dip. Of note, she was taking Azo at the time.   - Prior to her recent UTI, she can't remember her last infection. She feels like she gets a few UTIs per year.   - She doesn't notice a correlation with UTIs and intercourse.   - Reports postvoid dribble.   - OSIRIS: No  - UUI: No  - Frequency: Yes, this is worse when she drinks coffee.   - 0 culture proven UTIs in past year     - Denies gross hematuria.      Bowels  - Denies FI or constipation.     Health Maintenance  - Mammogram up to date: Yes - 9/30/24     Past medical and surgical hx reviewed - pertinent for HTN, third kidney removed at age 17     Physical Exam  Physical Exam  Constitutional:       General: She is not in acute distress.     Appearance: Normal appearance. She is normal weight. She is not ill-appearing.   Neurological:      Mental Status: She is alert.   Psychiatric:         Mood and Affect: Mood normal.         Behavior: Behavior normal.         Thought Content: Thought content normal.         Judgment: Judgment normal.     PVR by ultrasound is 150 cc    Assessment and Plan  39 y.o. female being assessed for hx of recurrent UTI, postvoid dribble, and OAB. Co morbidities: HTN.     Plan:   1. Hx of recurrent UTI    - Sent urine for pathnostics and microscopic UA to rule out infection. Urine with trace blood today on the dip.   - Results should be back on pathnostics by Thursday or Friday. We will call her with follow up.   - Reviewed possibility that patient may be infected with yeast that is causing her irritation vs having a bacterial infection. If the microscopic shows budding yeast again, we will treat her accordingly for yeast  - Take Pyridium 2-3x daily for sx relief before her urine results are in.     2. Postvoid dribble   - Encouraged patient to double void.     3. OAB  - We discussed OAB lifestyle changes (i.e., avoiding bladder irritants).  - She was also counseled on PFPT and medications.     Scribe Attestation:   Nica MIRANDA, am scribing for virtually, and in the presence of BERTHA Loco-CNP on 03/31/2025 at 11:30 AM.     IBernadette, personally performed the services described in the documentation as scribed in my presence and confirm it is both complete and accurate.

## 2025-04-01 LAB
APPEARANCE UR: ABNORMAL
BACTERIA #/AREA URNS HPF: ABNORMAL /HPF
BILIRUB UR QL STRIP: NEGATIVE
COLOR UR: YELLOW
GLUCOSE UR QL STRIP: NEGATIVE
HGB UR QL STRIP: NEGATIVE
HYALINE CASTS #/AREA URNS LPF: ABNORMAL /LPF
KETONES UR QL STRIP: NEGATIVE
LEUKOCYTE ESTERASE UR QL STRIP: ABNORMAL
NITRITE UR QL STRIP: NEGATIVE
PH UR STRIP: 6 [PH] (ref 5–8)
PROT UR QL STRIP: NEGATIVE
RBC #/AREA URNS HPF: ABNORMAL /HPF
SERVICE CMNT-IMP: ABNORMAL
SP GR UR STRIP: 1.01 (ref 1–1.03)
SQUAMOUS #/AREA URNS HPF: ABNORMAL /HPF
WBC #/AREA URNS HPF: ABNORMAL /HPF

## 2025-04-07 ENCOUNTER — TELEPHONE (OUTPATIENT)
Dept: OBSTETRICS AND GYNECOLOGY | Facility: CLINIC | Age: 40
End: 2025-04-07
Payer: MEDICAID

## 2025-04-07 DIAGNOSIS — N76.0 BACTERIAL VAGINOSIS: ICD-10-CM

## 2025-04-07 DIAGNOSIS — B96.89 BACTERIAL VAGINOSIS: ICD-10-CM

## 2025-04-07 RX ORDER — METRONIDAZOLE 500 MG/1
500 TABLET ORAL 2 TIMES DAILY
Qty: 14 TABLET | Refills: 0 | Status: SHIPPED | OUTPATIENT
Start: 2025-04-07 | End: 2025-04-14

## 2025-04-07 NOTE — TELEPHONE ENCOUNTER
"Pt returned call to office.    States she is feeling betting overall.  Only c/o \"minor sx\"   denies painful urination, but past day or so she feels \"off\" is concerned she is developing a UTI.  Pt informed office will have edilia review the urine culture from pathnostics and let her know her response.    "
Attempted to call pt back to fuether discuss her telephone note.   Got her voice mail.    Message left to call office.  
Pt contacted.    Pt informed of gardnerella vaginlis findings on urine culture report.   Pt aware it is not indicatve of a UTI, but rather an overgrowth of bacteria from the vagina.  Bernadette advises flagyl 500mg PO bid x 7d.  Order placed in system.  
Attending with

## 2025-04-09 ENCOUNTER — OFFICE VISIT (OUTPATIENT)
Dept: GASTROENTEROLOGY | Facility: CLINIC | Age: 40
End: 2025-04-09
Payer: MEDICAID

## 2025-04-09 VITALS
BODY MASS INDEX: 34.84 KG/M2 | WEIGHT: 203 LBS | HEART RATE: 76 BPM | SYSTOLIC BLOOD PRESSURE: 133 MMHG | TEMPERATURE: 98.7 F | DIASTOLIC BLOOD PRESSURE: 78 MMHG

## 2025-04-09 DIAGNOSIS — K21.9 GASTROESOPHAGEAL REFLUX DISEASE WITHOUT ESOPHAGITIS: ICD-10-CM

## 2025-04-09 DIAGNOSIS — K21.9 CHRONIC GERD: Primary | ICD-10-CM

## 2025-04-09 PROCEDURE — 99214 OFFICE O/P EST MOD 30 MIN: CPT | Performed by: NURSE PRACTITIONER

## 2025-04-09 PROCEDURE — 99204 OFFICE O/P NEW MOD 45 MIN: CPT | Performed by: NURSE PRACTITIONER

## 2025-04-09 ASSESSMENT — ENCOUNTER SYMPTOMS
ROS GI COMMENTS: SEE HPI
ENDOCRINE NEGATIVE: 1
EYES NEGATIVE: 1
FATIGUE: 0
FEVER: 0
COUGH: 0
CHEST TIGHTNESS: 0
DIFFICULTY URINATING: 0
HEMATOLOGIC/LYMPHATIC NEGATIVE: 1
MUSCULOSKELETAL NEGATIVE: 1
CHILLS: 0
PSYCHIATRIC NEGATIVE: 1
DEPRESSION: 0
STRIDOR: 0
NEUROLOGICAL NEGATIVE: 1
DIAPHORESIS: 0
RESPIRATORY NEGATIVE: 1
APNEA: 0
SHORTNESS OF BREATH: 0
ALLERGIC/IMMUNOLOGIC NEGATIVE: 1
CARDIOVASCULAR NEGATIVE: 1
WHEEZING: 0

## 2025-04-09 ASSESSMENT — PAIN SCALES - GENERAL: PAINLEVEL_OUTOF10: 0-NO PAIN

## 2025-04-09 NOTE — PROGRESS NOTES
Subjective   Patient ID: Ruby Choudhury is a 39 y.o. female who presents for GERD.    Presents today for GERD, this is ongoing for 10 years. She has been on multiple PPI's and H2 blockers. Eventually all of the medications stop working.  She is using prevacid daily and famotidine as needed.  She is having reflux a few times/week, improved with watching diet.    She reports a lot of belching, she denies dysphagia, odynophagia, vomiting, melena, blood in stools.    She has a BM daily    She denies a family hx of CRC, GI cancer   Father has reflux      Review of Systems   Constitutional:  Negative for chills, diaphoresis, fatigue and fever.   HENT: Negative.     Eyes: Negative.    Respiratory: Negative.  Negative for apnea, cough, chest tightness, shortness of breath, wheezing and stridor.    Cardiovascular: Negative.    Gastrointestinal:         See HPI    Endocrine: Negative.    Genitourinary: Negative.  Negative for difficulty urinating.   Musculoskeletal: Negative.    Skin: Negative.    Allergic/Immunologic: Negative.    Neurological: Negative.    Hematological: Negative.    Psychiatric/Behavioral: Negative.         Objective   Physical Exam  Constitutional:       Appearance: Normal appearance.   HENT:      Nose: Nose normal.   Eyes:      General: Lids are normal.   Cardiovascular:      Rate and Rhythm: Normal rate and regular rhythm.      Heart sounds: Normal heart sounds.   Pulmonary:      Effort: Pulmonary effort is normal.      Breath sounds: Normal breath sounds.   Abdominal:      General: Bowel sounds are normal.   Musculoskeletal:         General: Normal range of motion.   Skin:     General: Skin is warm and dry.   Neurological:      Mental Status: She is alert and oriented to person, place, and time.   Psychiatric:         Mood and Affect: Mood normal.         Assessment/Plan   Diagnoses and all orders for this visit:  Chronic GERD  -     Esophagogastroduodenoscopy (EGD); Future  Gastroesophageal reflux  disease without esophagitis  -     Referral to Gastroenterology  -     Esophagogastroduodenoscopy (EGD); Future      Refractory GERD/10 year h/o GERD-complete EGD, reviewed dietary and lifestyle modifications, reviewed how to use lansoprazole. F/up after EGD        BERTHA Ford-CNP 04/09/25 10:15 AM

## 2025-04-09 NOTE — PATIENT INSTRUCTIONS
GERD - Avoid overeating at mealtime.  Stay upright for 2 hours after eating.  Do not eat for 3-4 hours before going to bed.  Elevated the head of the bed 6 inches when you are sleeping. Avoid excessive amounts of fried foods, acidic foods, spicy foods, alcohol, caffeine, peppermint, spearmint, chocolate, non-steroidal anti- inflammatory medications, such as, Ibuprofen, Advil, Motrin, Aleve, Naproxen, Naprosyn, Moloxicam, Etodolac, and Voltaren.    Start lansoprazole about 30-60 minutes before first medication    Complete EGD

## 2025-04-11 ENCOUNTER — TELEPHONE (OUTPATIENT)
Dept: OBSTETRICS AND GYNECOLOGY | Facility: CLINIC | Age: 40
End: 2025-04-11
Payer: MEDICAID

## 2025-04-11 DIAGNOSIS — R30.0 DYSURIA: ICD-10-CM

## 2025-04-11 NOTE — TELEPHONE ENCOUNTER
Pt contacted to follow up on her [employer] message shesent earlier today.  Got her voice mail.  Detailed message left stating I responded back to her thru [employer].    Pt can certainly submit a urine sample for culture.  Order placed in system.

## 2025-04-13 LAB — BACTERIA UR CULT: NORMAL

## 2025-04-23 ENCOUNTER — APPOINTMENT (OUTPATIENT)
Dept: GASTROENTEROLOGY | Facility: HOSPITAL | Age: 40
End: 2025-04-23
Payer: MEDICAID

## 2025-05-22 DIAGNOSIS — R39.9 UTI SYMPTOMS: ICD-10-CM

## 2025-05-24 DIAGNOSIS — B37.41 YEAST CYSTITIS: Primary | ICD-10-CM

## 2025-05-24 LAB — BACTERIA UR CULT: ABNORMAL

## 2025-05-24 RX ORDER — FLUCONAZOLE 150 MG/1
150 TABLET ORAL ONCE
Qty: 2 TABLET | Refills: 0 | Status: SHIPPED | OUTPATIENT
Start: 2025-05-24 | End: 2025-05-24

## 2025-06-02 ENCOUNTER — TELEPHONE (OUTPATIENT)
Dept: OBSTETRICS AND GYNECOLOGY | Facility: CLINIC | Age: 40
End: 2025-06-02

## 2025-06-02 NOTE — TELEPHONE ENCOUNTER
Pt contacted.    Pt recently completed 2 courses of diflucan for yeast cystitis.  Her 2nd dose was on Friday 5/30/25.  Pt c/o her sx have returned.  Will provider send in another course?  Does she need a repeat culture first?  Nurse will make CNM aware.

## 2025-06-13 ENCOUNTER — ANESTHESIA (OUTPATIENT)
Dept: GASTROENTEROLOGY | Facility: HOSPITAL | Age: 40
End: 2025-06-13
Payer: MEDICAID

## 2025-06-13 ENCOUNTER — HOSPITAL ENCOUNTER (OUTPATIENT)
Dept: GASTROENTEROLOGY | Facility: HOSPITAL | Age: 40
Discharge: HOME | End: 2025-06-13
Payer: MEDICAID

## 2025-06-13 ENCOUNTER — ANESTHESIA EVENT (OUTPATIENT)
Dept: GASTROENTEROLOGY | Facility: HOSPITAL | Age: 40
End: 2025-06-13
Payer: MEDICAID

## 2025-06-13 VITALS
TEMPERATURE: 97.9 F | DIASTOLIC BLOOD PRESSURE: 68 MMHG | HEIGHT: 64 IN | BODY MASS INDEX: 34.15 KG/M2 | SYSTOLIC BLOOD PRESSURE: 145 MMHG | WEIGHT: 200 LBS | HEART RATE: 68 BPM | RESPIRATION RATE: 16 BRPM | OXYGEN SATURATION: 99 %

## 2025-06-13 DIAGNOSIS — K21.9 CHRONIC GERD: ICD-10-CM

## 2025-06-13 DIAGNOSIS — K21.9 GASTROESOPHAGEAL REFLUX DISEASE WITHOUT ESOPHAGITIS: Primary | ICD-10-CM

## 2025-06-13 LAB — HCG UR QL IA.RAPID: NEGATIVE

## 2025-06-13 PROCEDURE — 81025 URINE PREGNANCY TEST: CPT | Performed by: STUDENT IN AN ORGANIZED HEALTH CARE EDUCATION/TRAINING PROGRAM

## 2025-06-13 PROCEDURE — 2500000004 HC RX 250 GENERAL PHARMACY W/ HCPCS (ALT 636 FOR OP/ED): Performed by: ANESTHESIOLOGIST ASSISTANT

## 2025-06-13 PROCEDURE — 43239 EGD BIOPSY SINGLE/MULTIPLE: CPT | Performed by: INTERNAL MEDICINE

## 2025-06-13 RX ORDER — PROPOFOL 10 MG/ML
INJECTION, EMULSION INTRAVENOUS AS NEEDED
Status: DISCONTINUED | OUTPATIENT
Start: 2025-06-13 | End: 2025-06-13

## 2025-06-13 RX ORDER — LIDOCAINE HYDROCHLORIDE 20 MG/ML
INJECTION, SOLUTION EPIDURAL; INFILTRATION; INTRACAUDAL; PERINEURAL AS NEEDED
Status: DISCONTINUED | OUTPATIENT
Start: 2025-06-13 | End: 2025-06-13

## 2025-06-13 RX ORDER — ONDANSETRON HYDROCHLORIDE 2 MG/ML
INJECTION, SOLUTION INTRAVENOUS AS NEEDED
Status: DISCONTINUED | OUTPATIENT
Start: 2025-06-13 | End: 2025-06-13

## 2025-06-13 RX ORDER — MIDAZOLAM HYDROCHLORIDE 1 MG/ML
INJECTION, SOLUTION INTRAMUSCULAR; INTRAVENOUS AS NEEDED
Status: DISCONTINUED | OUTPATIENT
Start: 2025-06-13 | End: 2025-06-13

## 2025-06-13 RX ADMIN — SODIUM CHLORIDE, SODIUM LACTATE, POTASSIUM CHLORIDE, AND CALCIUM CHLORIDE: .6; .31; .03; .02 INJECTION, SOLUTION INTRAVENOUS at 08:50

## 2025-06-13 RX ADMIN — PROPOFOL 150 MCG/KG/MIN: 10 INJECTION, EMULSION INTRAVENOUS at 08:51

## 2025-06-13 RX ADMIN — PROPOFOL 40 MG: 10 INJECTION, EMULSION INTRAVENOUS at 08:52

## 2025-06-13 RX ADMIN — LIDOCAINE HYDROCHLORIDE 60 MG: 20 INJECTION, SOLUTION EPIDURAL; INFILTRATION; INTRACAUDAL; PERINEURAL at 08:50

## 2025-06-13 RX ADMIN — PROPOFOL 40 MG: 10 INJECTION, EMULSION INTRAVENOUS at 08:54

## 2025-06-13 RX ADMIN — PROPOFOL 60 MG: 10 INJECTION, EMULSION INTRAVENOUS at 08:50

## 2025-06-13 RX ADMIN — ONDANSETRON 4 MG: 2 INJECTION INTRAMUSCULAR; INTRAVENOUS at 08:54

## 2025-06-13 RX ADMIN — MIDAZOLAM 2 MG: 1 INJECTION INTRAMUSCULAR; INTRAVENOUS at 08:51

## 2025-06-13 SDOH — HEALTH STABILITY: MENTAL HEALTH: CURRENT SMOKER: 0

## 2025-06-13 ASSESSMENT — PAIN SCALES - GENERAL
PAINLEVEL_OUTOF10: 0 - NO PAIN
PAINLEVEL_OUTOF10: 0 - NO PAIN

## 2025-06-13 ASSESSMENT — PAIN - FUNCTIONAL ASSESSMENT
PAIN_FUNCTIONAL_ASSESSMENT: 0-10
PAIN_FUNCTIONAL_ASSESSMENT: 0-10

## 2025-06-13 NOTE — ANESTHESIA PREPROCEDURE EVALUATION
Patient: Ruby Choudhury    Procedure Information       Date/Time: 06/13/25 0830    Scheduled providers: Raj Figueroa MD    Procedure: EGD    [place] [facility] [place] Problems   Neuro   (+) Anxiety      GI   (+) GERD (gastroesophageal reflux disease)      Endocrine   (+) Class 2 obesity due to excess calories without serious comorbidity with body mass index (BMI) of 38.0 to 38.9 in adult      HEENT   (+) Hearing loss of both ears due to cerumen impaction      ID   (+) Tinea versicolor      Skin   (+) Parakeratosis   (+) Tinea versicolor       Clinical information reviewed:   Tobacco  Allergies  Meds   Med Hx  Surg Hx  OB Status  Fam Hx  Soc   Hx        NPO Detail:  NPO/Void Status  Carbohydrate Drink Given Prior to Surgery? : N  Date of Last Liquid: 06/13/25  Time of Last Liquid: 0600  Date of Last Solid: 06/12/25  Time of Last Solid: 2100  Last Intake Type: Clear fluids (H2O)  Time of Last Void: 0710         Physical Exam    Airway  Mallampati: II  TM distance: >3 FB  Neck ROM: full  Mouth opening: 3 or more finger widths     Cardiovascular   Rhythm: regular  Rate: normal     Dental - normal exam     Pulmonary Breath sounds clear to auscultation     Abdominal          Anesthesia Plan    History of general anesthesia?: yes  History of complications of general anesthesia?: no    ASA 2     MAC     The patient is not a current smoker.    intravenous induction   Anesthetic plan and risks discussed with patient.    Plan discussed with CAA.

## 2025-06-13 NOTE — DISCHARGE INSTRUCTIONS

## 2025-06-13 NOTE — ANESTHESIA POSTPROCEDURE EVALUATION
Patient: Ruby Choudhury    Procedure Summary       Date: 06/13/25 Room / Location: [facility]    Anesthesia Start: 0842 Anesthesia Stop: 0905    Procedure: EGD Diagnosis:       Gastroesophageal reflux disease without esophagitis      Chronic GERD    Scheduled Providers: Raj Figueroa MD Responsible Provider: Jaydon Tate MD    Anesthesia Type: MAC ASA Status: 2            Anesthesia Type: MAC    Vitals Value Taken Time   /84 06/13/25 09:12   Temp 36.8 06/13/25 09:12   Pulse 80 06/13/25 09:12   Resp 17 06/13/25 09:12   SpO2 100 06/13/25 09:12       Anesthesia Post Evaluation    Patient location during evaluation: PACU  Patient participation: complete - patient participated  Level of consciousness: awake and alert  Pain management: satisfactory to patient  Airway patency: patent  Cardiovascular status: acceptable  Respiratory status: acceptable  Hydration status: acceptable  Postoperative Nausea and Vomiting: none        No notable events documented.

## 2025-06-20 LAB
LABORATORY COMMENT REPORT: NORMAL
PATH REPORT.FINAL DX SPEC: NORMAL
PATH REPORT.GROSS SPEC: NORMAL
PATH REPORT.RELEVANT HX SPEC: NORMAL
PATH REPORT.TOTAL CANCER: NORMAL

## 2025-07-08 DIAGNOSIS — K21.9 GASTROESOPHAGEAL REFLUX DISEASE, UNSPECIFIED WHETHER ESOPHAGITIS PRESENT: Primary | ICD-10-CM

## 2025-07-08 RX ORDER — FAMOTIDINE 20 MG/1
20 TABLET, FILM COATED ORAL DAILY
Qty: 30 TABLET | Refills: 11 | Status: SHIPPED | OUTPATIENT
Start: 2025-07-08

## 2025-07-08 RX ORDER — LANSOPRAZOLE 30 MG/1
30 CAPSULE, DELAYED RELEASE ORAL
Qty: 90 CAPSULE | Refills: 3 | Status: SHIPPED | OUTPATIENT
Start: 2025-07-08

## 2025-07-23 ENCOUNTER — APPOINTMENT (OUTPATIENT)
Dept: UROLOGY | Facility: CLINIC | Age: 40
End: 2025-07-23
Payer: MEDICAID

## 2025-07-23 VITALS
WEIGHT: 198 LBS | HEART RATE: 80 BPM | BODY MASS INDEX: 33.8 KG/M2 | TEMPERATURE: 98 F | SYSTOLIC BLOOD PRESSURE: 131 MMHG | DIASTOLIC BLOOD PRESSURE: 83 MMHG | HEIGHT: 64 IN

## 2025-07-23 DIAGNOSIS — N32.89 BLADDER SPASM: ICD-10-CM

## 2025-07-23 DIAGNOSIS — B37.9 YEAST INFECTION: ICD-10-CM

## 2025-07-23 DIAGNOSIS — N39.0 RECURRENT UTI: Primary | ICD-10-CM

## 2025-07-23 DIAGNOSIS — R30.0 DYSURIA: ICD-10-CM

## 2025-07-23 LAB
POC APPEARANCE, URINE: ABNORMAL
POC BILIRUBIN, URINE: NEGATIVE
POC BLOOD, URINE: ABNORMAL
POC COLOR, URINE: YELLOW
POC GLUCOSE, URINE: NEGATIVE MG/DL
POC KETONES, URINE: NEGATIVE MG/DL
POC LEUKOCYTES, URINE: ABNORMAL
POC NITRITE,URINE: NEGATIVE
POC PH, URINE: 7 PH
POC PROTEIN, URINE: NEGATIVE MG/DL
POC SPECIFIC GRAVITY, URINE: 1.02
POC UROBILINOGEN, URINE: 1 EU/DL

## 2025-07-23 PROCEDURE — 99204 OFFICE O/P NEW MOD 45 MIN: CPT | Performed by: NURSE PRACTITIONER

## 2025-07-23 PROCEDURE — 3008F BODY MASS INDEX DOCD: CPT | Performed by: NURSE PRACTITIONER

## 2025-07-23 PROCEDURE — 81003 URINALYSIS AUTO W/O SCOPE: CPT | Performed by: NURSE PRACTITIONER

## 2025-07-23 PROCEDURE — 51701 INSERT BLADDER CATHETER: CPT | Performed by: NURSE PRACTITIONER

## 2025-07-23 RX ORDER — FLUCONAZOLE 100 MG/1
TABLET ORAL
Qty: 6 TABLET | Refills: 0 | Status: SHIPPED | OUTPATIENT
Start: 2025-07-23

## 2025-07-23 ASSESSMENT — PATIENT HEALTH QUESTIONNAIRE - PHQ9
SUM OF ALL RESPONSES TO PHQ9 QUESTIONS 1 AND 2: 0
2. FEELING DOWN, DEPRESSED OR HOPELESS: NOT AT ALL
1. LITTLE INTEREST OR PLEASURE IN DOING THINGS: NOT AT ALL

## 2025-07-23 NOTE — PROGRESS NOTES
07/23/25   [id number]    Chief Complaint   Patient presents with    UTI      Subjective      HPI Ruby Choudhury is a 39 y.o. female who presents for recurrent urinary tract infections.     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 Maria Luz Dey). UTIs here and there but really doing ok until March.     Symptoms of UTI is frequency, urgency a little dysuria with urination; UTI in March not related to intimacy; intercourse typically couple x a week but not lately as afraid of getting a UTI; afraid of what else could be;     DTF 8-10 x NTF 4x when UTI symptoms  Seldom incontinence      5/22/25 yeast cystitis urine culture  4/11/25 no growth urine culture  3/31/25 urine contaminated with vag cells, no hematuria  3/16/25 urine looked infected on UA/micro, no culture  3/3/25 no growth urine culture  2/25/25 contaminated periurethral pauly    PMH propanolol for anxiety/palpitations, reflux  PSH nephrectomy  FH no cancers  SH quit smoking years ago, works [employer]     had vasectomy few years ago, not pregnantPatient ID: Ruby Choudhury is a 39 y.o. female.    Bladder Catheterization    Date/Time: 7/23/2025 9:47 AM    Performed by: CLARISSE Fontenot  Authorized by: CLARISSE Fontenot    Procedure Details    Procedure: catheter insertion      Catheter insertion: non-indwelling (straight)      Catheter size: 12 Fr    Urine characteristics: clear    Post-Procedure Details     Outcome: patient tolerated procedure well with no complications            Objective     /83   Pulse 80   Temp 36.7 °C (98 °F)   Ht 1.626 m (5'  "4\")   Wt 89.8 kg (198 lb)   BMI 33.99 kg/m²    Physical Exam  General: Appears comfortable and in no apparent distress, well nourished  Head: Normocephalic, atraumatic  Neck: trachea midline  Respiratory: respirations unlabored, no wheezes, and no use of accessory muscles  Cardiovascular: at rest no dyspnea, well perfused  Skin: no visible rashes or lesions  Neurologic: grossly intact, oriented to person, place, and time  Psychiatric: mood and affect appropriate  Musculoskeletal: in chair for appt. no difficulty w upper body movement    No vulvar lesions, neg Qtip tenderness, no atrophy  Neg CST lying down, Neg levator ani tenderness or tightness  No cystocele, no rectocele or uterine prolapse, white chunky discharge;   Non tender ovaries/uterus, difficult exam d/t  body habitus   No rectal exam done        Assessment/Plan   Problem List Items Addressed This Visit    None  Visit Diagnoses         Recurrent UTI    -  Primary    Relevant Orders    POCT UA Automated manually resulted    Post-Void Residual          Orders Placed This Encounter   Procedures    Post-Void Residual    POCT UA Automated manually resulted     Release result to [facility]:   Immediate [1]      Evaluation  Vag culture sent to [facility] for Yeast  Treated for yeast in past urine, vaginal discharge w small white chunks  Hold Dmanose, may be high dosing 5000 mg multiple x per day contributing  Push fluids     Keflex 250 mg with intercourse as needed to prevent UTI as afraid of occurring again  Boric acid suppositories (Boric Life [employer])    Vickyal with her past history of kidney removed with a TEJAL    Follow up 4-6 weeks virtual Friday morning  Nurse line [phone]  Standing urine culture order     Please schedule for TEJAL in next week or two  Please schedule follow up virtual 4-6 weeks on 2,4,5 Friday morning of the month;     Angie Pillai, APRN-CNP  Lab Results   Component Value Date    GLUCOSE 103 (H) 12/30/2022    CALCIUM 9.6 12/30/2022     " 12/30/2022    K 4.6 12/30/2022    CO2 28 12/30/2022     12/30/2022    BUN 16 12/30/2022    CREATININE 0.72 12/30/2022

## 2025-07-23 NOTE — PATIENT INSTRUCTIONS
Evaluation  Vag culture sent to eval for Yeast  Treated for yeast in past urine, vaginal discharge w small white chunks  Hold Dmanose, may be high dosing 5000 mg multiple x per day contributing  Push fluids     Keflex 250 mg with intercourse as needed to prevent UTI as afraid of occurring again  Boric acid suppositories (Boric [employer]on)    Kamran with her past history of kidney removed with a TEJAL    Follow up 4-6 weeks virtual Friday morning  Nurse line [phone]  Standing urine culture order

## 2025-07-24 LAB — BV SCORE VAG QL: NORMAL

## 2025-07-25 LAB
APPEARANCE UR: CLEAR
BACTERIA #/AREA URNS HPF: ABNORMAL /HPF
BACTERIA UR CULT: ABNORMAL
BACTERIA UR CULT: ABNORMAL
BILIRUB UR QL STRIP: NEGATIVE
COLOR UR: YELLOW
GLUCOSE UR QL STRIP: NEGATIVE
HGB UR QL STRIP: NEGATIVE
HYALINE CASTS #/AREA URNS LPF: ABNORMAL /LPF
KETONES UR QL STRIP: NEGATIVE
LEUKOCYTE ESTERASE UR QL STRIP: ABNORMAL
NITRITE UR QL STRIP: NEGATIVE
PH UR STRIP: 7.5 [PH] (ref 5–8)
PROT UR QL STRIP: NEGATIVE
RBC #/AREA URNS HPF: ABNORMAL /HPF
SERVICE CMNT-IMP: ABNORMAL
SP GR UR STRIP: 1 (ref 1–1.03)
SQUAMOUS #/AREA URNS HPF: ABNORMAL /HPF
WBC #/AREA URNS HPF: ABNORMAL /HPF

## 2025-08-06 ENCOUNTER — APPOINTMENT (OUTPATIENT)
Dept: RADIOLOGY | Facility: HOSPITAL | Age: 40
End: 2025-08-06
Payer: MEDICAID

## 2025-08-07 ENCOUNTER — APPOINTMENT (OUTPATIENT)
Dept: RADIOLOGY | Facility: HOSPITAL | Age: 40
End: 2025-08-07
Payer: MEDICAID

## 2025-08-13 ENCOUNTER — HOSPITAL ENCOUNTER (OUTPATIENT)
Dept: RADIOLOGY | Facility: HOSPITAL | Age: 40
Discharge: HOME | End: 2025-08-13
Payer: MEDICAID

## 2025-08-13 DIAGNOSIS — N39.0 RECURRENT UTI: ICD-10-CM

## 2025-08-13 PROCEDURE — 76770 US EXAM ABDO BACK WALL COMP: CPT

## 2025-08-13 PROCEDURE — 76770 US EXAM ABDO BACK WALL COMP: CPT | Performed by: STUDENT IN AN ORGANIZED HEALTH CARE EDUCATION/TRAINING PROGRAM

## 2025-08-15 DIAGNOSIS — R30.0 DYSURIA: ICD-10-CM

## 2025-09-09 ENCOUNTER — APPOINTMENT (OUTPATIENT)
Dept: UROLOGY | Facility: CLINIC | Age: 40
End: 2025-09-09
Payer: MEDICAID

## 2026-01-28 ENCOUNTER — APPOINTMENT (OUTPATIENT)
Dept: PRIMARY CARE | Facility: CLINIC | Age: 41
End: 2026-01-28
Payer: MEDICAID